# Patient Record
Sex: MALE | Race: WHITE | NOT HISPANIC OR LATINO
[De-identification: names, ages, dates, MRNs, and addresses within clinical notes are randomized per-mention and may not be internally consistent; named-entity substitution may affect disease eponyms.]

---

## 2017-11-29 ENCOUNTER — APPOINTMENT (OUTPATIENT)
Dept: NEUROLOGY | Facility: CLINIC | Age: 32
End: 2017-11-29
Payer: COMMERCIAL

## 2017-11-29 VITALS
HEART RATE: 71 BPM | HEIGHT: 73 IN | DIASTOLIC BLOOD PRESSURE: 80 MMHG | BODY MASS INDEX: 30.02 KG/M2 | WEIGHT: 226.5 LBS | SYSTOLIC BLOOD PRESSURE: 115 MMHG

## 2017-11-29 DIAGNOSIS — G40.109 LOCALIZATION-RELATED (FOCAL) (PARTIAL) SYMPTOMATIC EPILEPSY AND EPILEPTIC SYNDROMES WITH SIMPLE PARTIAL SEIZURES, NOT INTRACTABLE, W/OUT STATUS EPILEPTICUS: ICD-10-CM

## 2017-11-29 PROCEDURE — 99204 OFFICE O/P NEW MOD 45 MIN: CPT

## 2017-12-11 ENCOUNTER — OUTPATIENT (OUTPATIENT)
Dept: OUTPATIENT SERVICES | Facility: HOSPITAL | Age: 32
LOS: 1 days | End: 2017-12-11
Payer: COMMERCIAL

## 2017-12-11 ENCOUNTER — APPOINTMENT (OUTPATIENT)
Dept: MRI IMAGING | Facility: CLINIC | Age: 32
End: 2017-12-11
Payer: COMMERCIAL

## 2017-12-11 DIAGNOSIS — G40.109 LOCALIZATION-RELATED (FOCAL) (PARTIAL) SYMPTOMATIC EPILEPSY AND EPILEPTIC SYNDROMES WITH SIMPLE PARTIAL SEIZURES, NOT INTRACTABLE, WITHOUT STATUS EPILEPTICUS: ICD-10-CM

## 2017-12-11 PROCEDURE — A9585: CPT

## 2017-12-11 PROCEDURE — 70553 MRI BRAIN STEM W/O & W/DYE: CPT

## 2017-12-11 PROCEDURE — 70553 MRI BRAIN STEM W/O & W/DYE: CPT | Mod: 26

## 2018-01-05 ENCOUNTER — INPATIENT (INPATIENT)
Facility: HOSPITAL | Age: 33
LOS: 1 days | Discharge: HOME | DRG: 101 | End: 2018-01-07
Attending: PSYCHIATRY & NEUROLOGY | Admitting: PSYCHIATRY & NEUROLOGY
Payer: COMMERCIAL

## 2018-01-05 VITALS
HEART RATE: 70 BPM | TEMPERATURE: 98 F | OXYGEN SATURATION: 97 % | SYSTOLIC BLOOD PRESSURE: 108 MMHG | DIASTOLIC BLOOD PRESSURE: 70 MMHG | RESPIRATION RATE: 18 BRPM

## 2018-01-05 DIAGNOSIS — G40.909 EPILEPSY, UNSPECIFIED, NOT INTRACTABLE, WITHOUT STATUS EPILEPTICUS: ICD-10-CM

## 2018-01-05 LAB
HCT VFR BLD CALC: 42.7 % — SIGNIFICANT CHANGE UP (ref 39–50)
HGB BLD-MCNC: 14.6 G/DL — SIGNIFICANT CHANGE UP (ref 13–17)
MCHC RBC-ENTMCNC: 30.2 PG — SIGNIFICANT CHANGE UP (ref 27–34)
MCHC RBC-ENTMCNC: 34.2 GM/DL — SIGNIFICANT CHANGE UP (ref 32–36)
MCV RBC AUTO: 88.4 FL — SIGNIFICANT CHANGE UP (ref 80–100)
PLATELET # BLD AUTO: 225 K/UL — SIGNIFICANT CHANGE UP (ref 150–400)
RBC # BLD: 4.83 M/UL — SIGNIFICANT CHANGE UP (ref 4.2–5.8)
RBC # FLD: 13.6 % — SIGNIFICANT CHANGE UP (ref 10.3–14.5)
WBC # BLD: 6.9 K/UL — SIGNIFICANT CHANGE UP (ref 3.8–10.5)
WBC # FLD AUTO: 6.9 K/UL — SIGNIFICANT CHANGE UP (ref 3.8–10.5)

## 2018-01-05 PROCEDURE — 95951: CPT | Mod: 26

## 2018-01-05 NOTE — H&P ADULT - ATTENDING COMMENTS
I personally saw and evaluated the patient at bedside with the neurology residents. I agree with the above finding and plan, except following as noted below. I discussed in detail the management and plan with the neurology team and patient. The relative risks and benefits of hospitalization, continuous video EEG monitoring with recording and storage of patient data, as well as the possibility of medication withdrawal in order to elicit events and seizures were discussed in detail with the residents and patient. Patient verbalized understanding and agreed to the plans. All of patient’s questions were addressed and answered thoroughly. Total interview duration, time allotted for patient questions, review of prior medical records/diagnostic data/studies, and time for medical documentation was greater than 70 minutes.    This is a 33yo LH man with an episode of focal to BL tonic-clonic sz 2 wks after discontinuing Wellbutrin and Xanax in 2016 who presents for characterization of spells described as feeling far off followed by 15s of tremulousness.     home AED: none  MRI brain w/wo, epilepsy protocol 12/11/17: unremarkable  Possible h/o abnormal EEG?    EEG findings  background: normal awake and asleep  non-epileptiform paroxysmal finding: occasional 0.5-1s broadly distributed burst of theta activity during wakefulness, unclear significance  interictal abnormality: none  ictal abnormality: none    - continuous video EEG to capture spell  - lorazepam prn convulsive sz  - sz precaution; pt counseled about risk of GTC I personally saw and evaluated the patient at bedside with the neurology residents. I agree with the above finding and plan, except following as noted below. I discussed in detail the management and plan with the neurology team and patient. The relative risks and benefits of hospitalization, continuous video EEG monitoring with recording and storage of patient data were discussed in detail with the residents and patient. Patient verbalized understanding and agreed to the plans. All of patient’s questions were addressed and answered thoroughly. Total interview duration, time allotted for patient questions, review of prior medical records/diagnostic data/studies, and time for medical documentation was greater than 70 minutes.    This is a 33yo LH man with an episode of focal to BL tonic-clonic sz 2 wks after discontinuing Wellbutrin and Xanax in 2016 who presents for characterization of spells described as feeling far off followed by 15s of tremulousness.     home AED: none  MRI brain w/wo, epilepsy protocol 12/11/17: unremarkable  Possible h/o abnormal EEG?    EEG findings  Background: normal awake and asleep  non-epileptiform paroxysmal finding: occasional 0.5-1s broadly distributed burst of theta activity during wakefulness, unclear significance  Interictal abnormality: none  Ictal abnormality: none    - continuous video EEG to capture spell  - lorazepam 2mg IV prn convulsive sz  - sz precaution; pt counseled about risk of GTC

## 2018-01-05 NOTE — H&P ADULT - HISTORY OF PRESENT ILLNESS
Patient is a 32 year old  man who had been treated for anxiety and panic since the age of 16. In early 2016 was being treated with Wellbutrin and Xanax (1 mg/d). In summer of last year the psychiatrist told him he could stop his medications. Two weeks later he was in his home and he felt "twitchy" followed by clonic movements of his left arm /hand. Three seconds later his body was thrown to the right. Had a GTC with tongue biting.  Had an EEG that was read as abormal. No MRI done at that time.    He was taken to [facility] where he was given some Xanax. Did not stay on meds and was well until three monthss ago when he was taking out the trash. Felt as if he was feeling far off and then became tremulous for 15 secs. Resolved without symptoms. Had a second event 7 weeks ago that was identical.  Patient also complains of restless legs when sitting still.

## 2018-01-05 NOTE — H&P ADULT - ASSESSMENT
32 year old male w/ PMHx of anxiety disorder TLE vs panic episodes. Had one GTC possibly related to alprazolam withdrawal several years ago (although two weeks after last dose) and two recent episodes of feeling in a trance followed by brief period of tremulousness.  Plan:  - admit for 48 hr VEEG with sleep deprivation on night #1  -MRI brain epilepsy protocol performed 12/11/17 showing no abnormalities  - on no AEDs currently  - if EEG shows spikes then start on Vimpat 50 bid with followup at clinic in 6 weeks.

## 2018-01-06 ENCOUNTER — TRANSCRIPTION ENCOUNTER (OUTPATIENT)
Age: 33
End: 2018-01-06

## 2018-01-06 LAB
ALBUMIN SERPL ELPH-MCNC: 4.2 G/DL — SIGNIFICANT CHANGE UP (ref 3.3–5)
ALP SERPL-CCNC: 49 U/L — SIGNIFICANT CHANGE UP (ref 40–120)
ALT FLD-CCNC: 149 U/L — HIGH (ref 10–45)
ANION GAP SERPL CALC-SCNC: 15 MMOL/L — SIGNIFICANT CHANGE UP (ref 5–17)
AST SERPL-CCNC: 74 U/L — HIGH (ref 10–40)
BASOPHILS # BLD AUTO: 0.04 K/UL — SIGNIFICANT CHANGE UP (ref 0–0.2)
BASOPHILS NFR BLD AUTO: 0.6 % — SIGNIFICANT CHANGE UP (ref 0–2)
BILIRUB SERPL-MCNC: 0.5 MG/DL — SIGNIFICANT CHANGE UP (ref 0.2–1.2)
BUN SERPL-MCNC: 17 MG/DL — SIGNIFICANT CHANGE UP (ref 7–23)
CALCIUM SERPL-MCNC: 9.4 MG/DL — SIGNIFICANT CHANGE UP (ref 8.4–10.5)
CHLORIDE SERPL-SCNC: 103 MMOL/L — SIGNIFICANT CHANGE UP (ref 96–108)
CO2 SERPL-SCNC: 22 MMOL/L — SIGNIFICANT CHANGE UP (ref 22–31)
CREAT SERPL-MCNC: 0.98 MG/DL — SIGNIFICANT CHANGE UP (ref 0.5–1.3)
EOSINOPHIL # BLD AUTO: 0.16 K/UL — SIGNIFICANT CHANGE UP (ref 0–0.5)
EOSINOPHIL NFR BLD AUTO: 2.3 % — SIGNIFICANT CHANGE UP (ref 0–6)
GLUCOSE SERPL-MCNC: 82 MG/DL — SIGNIFICANT CHANGE UP (ref 70–99)
IMM GRANULOCYTES NFR BLD AUTO: 0.3 % — SIGNIFICANT CHANGE UP (ref 0–1.5)
LYMPHOCYTES # BLD AUTO: 3.08 K/UL — SIGNIFICANT CHANGE UP (ref 1–3.3)
LYMPHOCYTES # BLD AUTO: 44.6 % — HIGH (ref 13–44)
MANUAL SMEAR VERIFICATION: SIGNIFICANT CHANGE UP
MONOCYTES # BLD AUTO: 0.58 K/UL — SIGNIFICANT CHANGE UP (ref 0–0.9)
MONOCYTES NFR BLD AUTO: 8.4 % — SIGNIFICANT CHANGE UP (ref 2–14)
NEUTROPHILS # BLD AUTO: 3.02 K/UL — SIGNIFICANT CHANGE UP (ref 1.8–7.4)
NEUTROPHILS NFR BLD AUTO: 43.8 % — SIGNIFICANT CHANGE UP (ref 43–77)
PLAT MORPH BLD: NORMAL — SIGNIFICANT CHANGE UP
POTASSIUM SERPL-MCNC: 3.8 MMOL/L — SIGNIFICANT CHANGE UP (ref 3.5–5.3)
POTASSIUM SERPL-SCNC: 3.8 MMOL/L — SIGNIFICANT CHANGE UP (ref 3.5–5.3)
PROT SERPL-MCNC: 7 G/DL — SIGNIFICANT CHANGE UP (ref 6–8.3)
RBC BLD AUTO: NORMAL — SIGNIFICANT CHANGE UP
SODIUM SERPL-SCNC: 140 MMOL/L — SIGNIFICANT CHANGE UP (ref 135–145)

## 2018-01-06 PROCEDURE — 99222 1ST HOSP IP/OBS MODERATE 55: CPT | Mod: GC

## 2018-01-06 PROCEDURE — 95951: CPT | Mod: 26

## 2018-01-06 NOTE — DISCHARGE NOTE ADULT - CARE PROVIDER_API CALL
Nato Garzon), Clinical Neurophysiology; Neurology  [address] [address]  Phone: [phone]  Fax: [phone]

## 2018-01-06 NOTE — DISCHARGE NOTE ADULT - CARE PLAN
Principal Discharge DX:	Seizure  Goal:	capture events  Instructions for follow-up, activity and diet:	follow up with Dr. Garzon for any medication changes as needed

## 2018-01-06 NOTE — DISCHARGE NOTE ADULT - PATIENT PORTAL LINK FT
“You can access the FollowHealth Patient Portal, offered by [facility], by registering with the following website: [email or web address]”

## 2018-01-06 NOTE — DISCHARGE NOTE ADULT - HOSPITAL COURSE
32 year old  man who was admitted to the Epilepsy Monitoring Unit for event captures. He has a history of anxiety and panic disorder since age 16. He was treated w/ Wellbutrin and Xanax and two weeks after stopping these medications, had an episode described as a GTC seizure. At that time, had an EEG done which was reportedly abnormal. Most recently, had two episodes, first episode was three months prior to admission where he felt feeling off and became tremulous and second one was similar and 7 weeks ago.     During his stay, sleep deprivation, photic stimulation and hyperventilation were done to increase possibility of capturing seizures. No events or EEG changes were recorded.     He will be discharged without AEDs and is to follow with Dr. Garzon. 32 year old  man who was admitted to the Epilepsy Monitoring Unit for event captures. He has a history of anxiety and panic disorder since age 16. He was treated w/ Wellbutrin and Xanax and two weeks after stopping these medications, had an episode described as a GTC seizure. At that time, had an EEG done which was reportedly abnormal. Most recently, had two episodes, first episode was three months prior to admission where he felt feeling off and became tremulous and second one was similar and 7 weeks ago.     During his stay, sleep deprivation, photic stimulation and hyperventilation were done to increase possibility of capturing seizures. No events or EEG changes were recorded. Normal awake and asleep EEG throughout his hospitalization.    He will be discharged without AEDs and is to follow with Dr. Garzon.

## 2018-01-07 ENCOUNTER — OTHER (OUTPATIENT)
Age: 33
End: 2018-01-07

## 2018-01-07 VITALS
TEMPERATURE: 98 F | HEART RATE: 68 BPM | RESPIRATION RATE: 18 BRPM | SYSTOLIC BLOOD PRESSURE: 106 MMHG | DIASTOLIC BLOOD PRESSURE: 67 MMHG | OXYGEN SATURATION: 96 %

## 2018-01-07 PROCEDURE — 85027 COMPLETE CBC AUTOMATED: CPT

## 2018-01-07 PROCEDURE — 95951: CPT | Mod: 26

## 2018-01-07 PROCEDURE — 95951: CPT

## 2018-01-07 PROCEDURE — 80053 COMPREHEN METABOLIC PANEL: CPT

## 2018-01-07 PROCEDURE — 99232 SBSQ HOSP IP/OBS MODERATE 35: CPT | Mod: GC

## 2018-01-07 NOTE — PROGRESS NOTE ADULT - ASSESSMENT
32 year old male w/ PMHx of anxiety disorder TLE vs panic episodes. Had one GTC possibly in  the setting of stopping withdrawal several years ago) and two recent episodes of feeling in a trance followed by brief period of tremulousness. EEG grossly with occasional 0.5-1s broadly distributed burst of theta activity during wakefulness, unclear significance, Final EEG read pending    Patient to be discharged home with no AEDs  Patient to Follow up with Neurologist and PCP

## 2018-01-07 NOTE — PROGRESS NOTE ADULT - ATTENDING COMMENTS
I personally saw and evaluated the patient at beside with the neurology residents. I agree with the above finding and plan, except following as noted below. I discussed in detail the management and plan with the neurology team and patient, including the risks and the benefits of the diagnostic and therapeutic options. All of patient’s questions were addressed and answered thoroughly. Total interview duration, time allotted for patient questions, review of medical records/labs/imaging, and time for medical documentation was greater than 35 minutes.    This is a 33yo LH man with an episode of focal to BL tonic-clonic sz 2 wks after discontinuing Wellbutrin and Xanax in 2016 who presents for characterization of spells described as feeling far off followed by 15s of tremulousness.     home AED: none  MRI brain w/wo, epilepsy protocol 12/11/17: unremarkable  Possible h/o abnormal EEG?    EEG findings  Background: normal awake and asleep  non-epileptiform paroxysmal finding: occasional 0.5-1s broadly distributed burst of theta activity during wakefulness, unclear significance  Interictal abnormality: none  Ictal abnormality: none    - discontinuous video EEG  - lorazepam 2mg IV prn convulsive sz  - sz precaution; pt counseled about risk of GTC  - DC home today per pt's request  - f/u Dr. Garzon outpt
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
## 2018-01-10 ENCOUNTER — APPOINTMENT (OUTPATIENT)
Dept: NEUROLOGY | Facility: CLINIC | Age: 33
End: 2018-01-10

## 2018-01-25 ENCOUNTER — EMERGENCY (EMERGENCY)
Facility: HOSPITAL | Age: 33
LOS: 1 days | Discharge: HOME | End: 2018-01-25
Attending: EMERGENCY MEDICINE | Admitting: EMERGENCY MEDICINE
Payer: COMMERCIAL

## 2018-01-25 VITALS
RESPIRATION RATE: 16 BRPM | TEMPERATURE: 98 F | DIASTOLIC BLOOD PRESSURE: 76 MMHG | OXYGEN SATURATION: 99 % | SYSTOLIC BLOOD PRESSURE: 114 MMHG | HEART RATE: 59 BPM

## 2018-01-25 VITALS
SYSTOLIC BLOOD PRESSURE: 121 MMHG | TEMPERATURE: 98 F | DIASTOLIC BLOOD PRESSURE: 84 MMHG | HEART RATE: 60 BPM | RESPIRATION RATE: 16 BRPM | OXYGEN SATURATION: 99 %

## 2018-01-25 PROCEDURE — 99284 EMERGENCY DEPT VISIT MOD MDM: CPT

## 2018-01-25 RX ORDER — KETOROLAC TROMETHAMINE 30 MG/ML
15 SYRINGE (ML) INJECTION ONCE
Qty: 0 | Refills: 0 | Status: DISCONTINUED | OUTPATIENT
Start: 2018-01-25 | End: 2018-01-25

## 2018-01-25 RX ORDER — ONDANSETRON 8 MG/1
4 TABLET, FILM COATED ORAL ONCE
Qty: 0 | Refills: 0 | Status: COMPLETED | OUTPATIENT
Start: 2018-01-25 | End: 2018-01-25

## 2018-01-25 RX ADMIN — Medication 15 MILLIGRAM(S): at 15:20

## 2018-01-25 RX ADMIN — ONDANSETRON 4 MILLIGRAM(S): 8 TABLET, FILM COATED ORAL at 15:20

## 2018-01-25 NOTE — ED PROVIDER NOTE - ATTENDING CONTRIBUTION TO CARE
CN 2-12 intact, normal coordination, normal finger to nose, normal heel to shin, negative Romberg, no pronator drift, no gait instability, 5/5 strength in upper and lower extremities, normal sensory throughout, alert and oriented to person, place, time, and situation.   no lesions to head, no abrasions, no bruising, patient with labile mood, no midline cervical tenderness to palpation, ctab, bilateral breath sounds, trachea midline, cooperative, with capacity and insight  alert, no thoracic/lumbar/sacral tenderness to palpation   patient with mild head injury and no loss of consciousness, no change in vision, ? pain with lights per patient but no objective neurological findings  history and pe findings do not warrant radiation of ct head at this time and patient educated on this  The patient was re-examined after interventions and is feeling much better.  The patient will follow up with their primary physician this week.   No immediate life threatening issues present on history or clinical exam. Patient is a safe disposition home, has capacity and insight into their condition, ambulatory in the emergency department and will follow up with their doctor(s) this week. Patient  understands anticipatory guidance and was given strict return and follow up precautions. The patient has been informed of all concerning signs and symptoms to return to Emergency Department, the necessity to follow up with the PMD/Clinic/follow up provided within 2-3 days was explained, and the patient reports understanding of above with capacity and insight.
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
## 2018-01-26 ENCOUNTER — EMERGENCY (EMERGENCY)
Facility: HOSPITAL | Age: 33
LOS: 1 days | Discharge: HOME | End: 2018-01-26
Attending: EMERGENCY MEDICINE | Admitting: EMERGENCY MEDICINE
Payer: COMMERCIAL

## 2018-01-26 VITALS
HEART RATE: 66 BPM | DIASTOLIC BLOOD PRESSURE: 84 MMHG | RESPIRATION RATE: 18 BRPM | OXYGEN SATURATION: 96 % | TEMPERATURE: 98 F | SYSTOLIC BLOOD PRESSURE: 129 MMHG

## 2018-01-26 PROCEDURE — 99283 EMERGENCY DEPT VISIT LOW MDM: CPT | Mod: 25

## 2018-01-26 PROCEDURE — 96372 THER/PROPH/DIAG INJ SC/IM: CPT

## 2018-01-26 PROCEDURE — 99284 EMERGENCY DEPT VISIT MOD MDM: CPT

## 2018-01-26 PROCEDURE — 99284 EMERGENCY DEPT VISIT MOD MDM: CPT | Mod: 25

## 2018-01-26 PROCEDURE — 70450 CT HEAD/BRAIN W/O DYE: CPT | Mod: 26

## 2018-01-26 PROCEDURE — 70450 CT HEAD/BRAIN W/O DYE: CPT
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
## 2018-01-26 NOTE — ED PROVIDER NOTE - PHYSICAL EXAMINATION
Gen: Well appearing, NAD  Head: NCAT  HEENT: PERRL, MMM, normal conjunctiva, anicteric, neck supple  Lung: CTAB, no adventitious sounds  CV: RRR, no murmurs, rubs or gallops  Abd: soft, NTND, no rebound or guarding, no CVAT  MSK: No edema, no visible deformities; mild TTP L paraspinal lumbar spine  Neuro: No focal neurologic deficits. CN II-XII grossly intact. 5/5 strength and normal sensation in all extremities. able to ambulate without antalgic gait  Skin: Warm and dry, no evidence of rash  Psych: anxious, agitated mood and affect
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
## 2018-01-26 NOTE — ED PROVIDER NOTE - NS ED ROS FT
ROS: denies HA, weakness, dizziness, fevers/chills, vomiting, chest pain, SOB, diaphoresis, abdominal pain, diarrhea, neuro deficits, dysuria/hematuria, rash    +nausea,
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
## 2018-02-21 ENCOUNTER — APPOINTMENT (OUTPATIENT)
Dept: NEUROLOGY | Facility: CLINIC | Age: 33
End: 2018-02-21

## 2018-04-24 ENCOUNTER — APPOINTMENT (OUTPATIENT)
Dept: PAIN MANAGEMENT | Facility: CLINIC | Age: 33
End: 2018-04-24
Payer: COMMERCIAL

## 2018-04-24 VITALS
HEART RATE: 67 BPM | HEIGHT: 73 IN | BODY MASS INDEX: 28.23 KG/M2 | DIASTOLIC BLOOD PRESSURE: 75 MMHG | WEIGHT: 213 LBS | SYSTOLIC BLOOD PRESSURE: 115 MMHG

## 2018-04-24 PROCEDURE — 99214 OFFICE O/P EST MOD 30 MIN: CPT

## 2019-01-07 ENCOUNTER — MEDICATION RENEWAL (OUTPATIENT)
Age: 34
End: 2019-01-07

## 2019-01-07 PROBLEM — F41.0 PANIC DISORDER [EPISODIC PAROXYSMAL ANXIETY]: Chronic | Status: ACTIVE | Noted: 2018-01-05

## 2019-01-07 PROBLEM — G40.909 EPILEPSY, UNSPECIFIED, NOT INTRACTABLE, WITHOUT STATUS EPILEPTICUS: Chronic | Status: ACTIVE | Noted: 2018-01-25

## 2019-01-07 PROBLEM — F41.9 ANXIETY DISORDER, UNSPECIFIED: Chronic | Status: ACTIVE | Noted: 2018-01-05

## 2019-01-08 ENCOUNTER — RX RENEWAL (OUTPATIENT)
Age: 34
End: 2019-01-08

## 2019-01-14 ENCOUNTER — RECORD ABSTRACTING (OUTPATIENT)
Age: 34
End: 2019-01-14

## 2019-01-14 DIAGNOSIS — I83.812 VARICOSE VEINS OF LEFT LOWER EXTREMITY WITH PAIN: ICD-10-CM

## 2019-01-14 DIAGNOSIS — Z87.39 PERSONAL HISTORY OF OTHER DISEASES OF THE MUSCULOSKELETAL SYSTEM AND CONNECTIVE TISSUE: ICD-10-CM

## 2019-01-14 DIAGNOSIS — Z78.9 OTHER SPECIFIED HEALTH STATUS: ICD-10-CM

## 2019-01-23 ENCOUNTER — APPOINTMENT (OUTPATIENT)
Dept: FAMILY MEDICINE | Facility: CLINIC | Age: 34
End: 2019-01-23

## 2019-01-29 ENCOUNTER — TRANSCRIPTION ENCOUNTER (OUTPATIENT)
Age: 34
End: 2019-01-29

## 2019-03-19 ENCOUNTER — APPOINTMENT (OUTPATIENT)
Dept: INTERNAL MEDICINE | Facility: CLINIC | Age: 34
End: 2019-03-19
Payer: COMMERCIAL

## 2019-03-19 VITALS
HEART RATE: 80 BPM | HEIGHT: 73 IN | BODY MASS INDEX: 30.62 KG/M2 | WEIGHT: 231 LBS | DIASTOLIC BLOOD PRESSURE: 64 MMHG | SYSTOLIC BLOOD PRESSURE: 122 MMHG

## 2019-03-19 DIAGNOSIS — F41.1 GENERALIZED ANXIETY DISORDER: ICD-10-CM

## 2019-03-19 DIAGNOSIS — F32.1 MAJOR DEPRESSIVE DISORDER, SINGLE EPISODE, MODERATE: ICD-10-CM

## 2019-03-19 PROCEDURE — G0442 ANNUAL ALCOHOL SCREEN 15 MIN: CPT

## 2019-03-19 PROCEDURE — 99214 OFFICE O/P EST MOD 30 MIN: CPT | Mod: 25

## 2019-03-19 PROCEDURE — G0444 DEPRESSION SCREEN ANNUAL: CPT

## 2019-03-19 NOTE — PHYSICAL EXAM
[No Acute Distress] : no acute distress [Well Nourished] : well nourished [Well Developed] : well developed [Well-Appearing] : well-appearing [Speech Grossly Normal] : speech grossly normal [Memory Grossly Normal] : memory grossly normal [Normal Affect] : the affect was normal [Alert and Oriented x3] : oriented to person, place, and time [Normal Insight/Judgement] : insight and judgment were intact [Normal Mental Status] : the patient's orientation, memory, attention, language and fund of knowledge were normal [Appropriate] : appropriate [Anxious] : anxious [Normal Rate] : a normal rate [Normal Rhythm] : a normal rhythm [Normal Tone] : normal tone [Normal Volume] : normal volume [Normal] : normal throught processes [Normal Rate Of Thought] : a normal rate of thought [Normal Associations] :  no deficiency [Agitated] : not agitated [Depressed] : not depressed [Flat] : not flat [Labile] : not labile [Impaired judgment] : intact judgment [Impaired Insight] : intact insight [Delusions] : exhibited no delusions [Hallucinations] : exhibited no hallucinations [Obsessions] : denied obsessions [Preoccupation with Violence] : denied preoccupation with violent thoughts [Suicidal Ideation] : denied suicidal ideation [Suicidal Intent] : denied suicidal intent [Suicidal Plan] : denied suicidal plans [Homicidal Ideation] : denied homicidal ideation [Homicidal Intent] : denied homicidal intention [Homicidal Plan] : denied homicidal plans [de-identified] : Slightly anxious

## 2019-04-25 RX ORDER — CLONAZEPAM 0.5 MG/1
0.5 TABLET ORAL DAILY
Qty: 30 | Refills: 0 | Status: ACTIVE | COMMUNITY
Start: 2019-01-07 | End: 1900-01-01

## 2019-05-24 RX ORDER — BUPROPION HYDROCHLORIDE 150 MG/1
150 TABLET, FILM COATED ORAL
Refills: 0 | Status: COMPLETED | COMMUNITY
End: 2019-05-24

## 2019-05-24 RX ORDER — CLONAZEPAM 0.5 MG/1
0.5 TABLET ORAL
Qty: 30 | Refills: 0 | Status: ACTIVE | COMMUNITY
Start: 1900-01-01 | End: 1900-01-01

## 2019-06-20 RX ORDER — BUPROPION HYDROCHLORIDE 150 MG/1
150 TABLET, FILM COATED, EXTENDED RELEASE ORAL TWICE DAILY
Qty: 60 | Refills: 0 | Status: ACTIVE | COMMUNITY
Start: 2019-01-07 | End: 1900-01-01

## 2020-10-29 ENCOUNTER — TRANSCRIPTION ENCOUNTER (OUTPATIENT)
Age: 35
End: 2020-10-29

## 2021-05-13 ENCOUNTER — TRANSCRIPTION ENCOUNTER (OUTPATIENT)
Age: 36
End: 2021-05-13

## 2021-06-08 ENCOUNTER — TRANSCRIPTION ENCOUNTER (OUTPATIENT)
Age: 36
End: 2021-06-08

## 2021-08-30 NOTE — PATIENT PROFILE ADULT. - AS SC BRADEN NUTRITION
(4) excellent Staging Info: By selecting yes to the question above you will include information on AJCC 8 tumor staging in your Mohs note. Information on tumor staging will be automatically added for SCCs on the head and neck. AJCC 8 includes tumor size, tumor depth, perineural involvement and bone invasion.

## 2022-03-26 ENCOUNTER — TRANSCRIPTION ENCOUNTER (OUTPATIENT)
Age: 37
End: 2022-03-26

## 2022-03-29 ENCOUNTER — TRANSCRIPTION ENCOUNTER (OUTPATIENT)
Age: 37
End: 2022-03-29
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
